# Patient Record
Sex: MALE | Race: WHITE
[De-identification: names, ages, dates, MRNs, and addresses within clinical notes are randomized per-mention and may not be internally consistent; named-entity substitution may affect disease eponyms.]

---

## 2017-09-18 ENCOUNTER — RX ONLY (RX ONLY)
Age: 37
End: 2017-09-18

## 2018-04-26 ENCOUNTER — APPOINTMENT (OUTPATIENT)
Dept: URBAN - METROPOLITAN AREA CLINIC 186 | Age: 38
Setting detail: DERMATOLOGY
End: 2018-04-26

## 2018-04-26 VITALS — HEIGHT: 72 IN | WEIGHT: 170 LBS

## 2018-04-26 DIAGNOSIS — D22 MELANOCYTIC NEVI: ICD-10-CM

## 2018-04-26 PROBLEM — D22.39 MELANOCYTIC NEVI OF OTHER PARTS OF FACE: Status: ACTIVE | Noted: 2018-04-26

## 2018-04-26 PROCEDURE — OTHER FOLLOW UP FOR NEXT VISIT: OTHER

## 2018-04-26 PROCEDURE — OTHER SHAVE REMOVAL (NO PATHOLOGY): OTHER

## 2018-04-26 PROCEDURE — 11310 SHAVE SKIN LESION 0.5 CM/<: CPT

## 2018-04-26 PROCEDURE — OTHER ADDITIONAL NOTES: OTHER

## 2018-04-26 PROCEDURE — OTHER REASSURANCE: OTHER

## 2018-04-26 PROCEDURE — OTHER COUNSELING: OTHER

## 2018-04-26 ASSESSMENT — LOCATION DETAILED DESCRIPTION DERM
LOCATION DETAILED: LEFT SUPERIOR LATERAL BUCCAL CHEEK
LOCATION DETAILED: LEFT INFERIOR CENTRAL MALAR CHEEK

## 2018-04-26 ASSESSMENT — LOCATION SIMPLE DESCRIPTION DERM: LOCATION SIMPLE: LEFT CHEEK

## 2018-04-26 ASSESSMENT — LOCATION ZONE DERM: LOCATION ZONE: FACE

## 2018-04-26 NOTE — PROCEDURE: SHAVE REMOVAL (NO PATHOLOGY)
Medical Necessity Information: It is in your best interest to select a reason for this procedure from the list below. All of these items fulfill various CMS LCD requirements except the new and changing color options.
Hemostasis: Drysol
Include Z78.9 (Other Specified Conditions Influencing Health Status) As An Associated Diagnosis?: No
Detail Level: Detailed
Anesthesia Type: 1% lidocaine with epinephrine
Wound Care: Petrolatum
Consent was obtained from the patient. The risks and benefits to therapy were discussed in detail. Specifically, the risks of infection, scarring, bleeding, prolonged wound healing, incomplete removal, allergy to anesthesia, nerve injury and recurrence were addressed. Prior to the procedure, the treatment site was clearly identified and confirmed by the patient. All components of Universal Protocol/PAUSE Rule completed.
Post-Care Instructions: I reviewed with the patient in detail post-care instructions. Patient is to keep the biopsy site dry overnight, and then apply bacitracin twice daily until healed. Patient may apply hydrogen peroxide soaks to remove any crusting.
X Size Of Lesion In Cm (Optional): 0
Medical Necessity Clause: This procedure was medically necessary because the lesion that was treated was: irritated when shaving
Path Notes (To The Dermatopathologist): Please check margins.

## 2018-08-27 ENCOUNTER — APPOINTMENT (OUTPATIENT)
Dept: URBAN - METROPOLITAN AREA CLINIC 186 | Age: 38
Setting detail: DERMATOLOGY
End: 2018-08-27

## 2018-08-27 DIAGNOSIS — L82.1 OTHER SEBORRHEIC KERATOSIS: ICD-10-CM

## 2018-08-27 DIAGNOSIS — D485 NEOPLASM OF UNCERTAIN BEHAVIOR OF SKIN: ICD-10-CM

## 2018-08-27 DIAGNOSIS — Z87.2 PERSONAL HISTORY OF DISEASES OF THE SKIN AND SUBCUTANEOUS TISSUE: ICD-10-CM

## 2018-08-27 DIAGNOSIS — L73.8 OTHER SPECIFIED FOLLICULAR DISORDERS: ICD-10-CM

## 2018-08-27 DIAGNOSIS — D18.0 HEMANGIOMA: ICD-10-CM

## 2018-08-27 DIAGNOSIS — L81.4 OTHER MELANIN HYPERPIGMENTATION: ICD-10-CM

## 2018-08-27 DIAGNOSIS — Z80.8 FAMILY HISTORY OF MALIGNANT NEOPLASM OF OTHER ORGANS OR SYSTEMS: ICD-10-CM

## 2018-08-27 DIAGNOSIS — D22 MELANOCYTIC NEVI: ICD-10-CM

## 2018-08-27 PROBLEM — D22.5 MELANOCYTIC NEVI OF TRUNK: Status: ACTIVE | Noted: 2018-08-27

## 2018-08-27 PROBLEM — D22.61 MELANOCYTIC NEVI OF RIGHT UPPER LIMB, INCLUDING SHOULDER: Status: ACTIVE | Noted: 2018-08-27

## 2018-08-27 PROBLEM — D18.01 HEMANGIOMA OF SKIN AND SUBCUTANEOUS TISSUE: Status: ACTIVE | Noted: 2018-08-27

## 2018-08-27 PROBLEM — D48.5 NEOPLASM OF UNCERTAIN BEHAVIOR OF SKIN: Status: ACTIVE | Noted: 2018-08-27

## 2018-08-27 PROBLEM — D22.39 MELANOCYTIC NEVI OF OTHER PARTS OF FACE: Status: ACTIVE | Noted: 2018-08-27

## 2018-08-27 PROCEDURE — 11100: CPT

## 2018-08-27 PROCEDURE — 99214 OFFICE O/P EST MOD 30 MIN: CPT | Mod: 25

## 2018-08-27 PROCEDURE — OTHER SUNSCREEN RECOMMENDATIONS: OTHER

## 2018-08-27 PROCEDURE — OTHER REASSURANCE: OTHER

## 2018-08-27 PROCEDURE — 11101: CPT

## 2018-08-27 PROCEDURE — OTHER COUNSELING: OTHER

## 2018-08-27 PROCEDURE — OTHER BIOPSY BY SHAVE METHOD: OTHER

## 2018-08-27 PROCEDURE — OTHER ADDITIONAL NOTES: OTHER

## 2018-08-27 ASSESSMENT — LOCATION SIMPLE DESCRIPTION DERM
LOCATION SIMPLE: RIGHT CHEEK
LOCATION SIMPLE: RIGHT FOREHEAD
LOCATION SIMPLE: RIGHT UPPER ARM
LOCATION SIMPLE: RIGHT PRETIBIAL REGION
LOCATION SIMPLE: LEFT FOREHEAD
LOCATION SIMPLE: LEFT POSTERIOR THIGH
LOCATION SIMPLE: LEFT UPPER BACK
LOCATION SIMPLE: CHEST
LOCATION SIMPLE: ABDOMEN
LOCATION SIMPLE: RIGHT POSTERIOR THIGH
LOCATION SIMPLE: LEFT CHEEK
LOCATION SIMPLE: RIGHT UPPER BACK

## 2018-08-27 ASSESSMENT — LOCATION DETAILED DESCRIPTION DERM
LOCATION DETAILED: LEFT PROXIMAL MEDIAL POSTERIOR THIGH
LOCATION DETAILED: RIGHT FOREHEAD
LOCATION DETAILED: RIGHT PROXIMAL LATERAL POSTERIOR THIGH
LOCATION DETAILED: LEFT CENTRAL MALAR CHEEK
LOCATION DETAILED: RIGHT SUPERIOR UPPER BACK
LOCATION DETAILED: UPPER STERNUM
LOCATION DETAILED: LEFT SUPERIOR UPPER BACK
LOCATION DETAILED: RIGHT INFERIOR CENTRAL MALAR CHEEK
LOCATION DETAILED: RIGHT CENTRAL MALAR CHEEK
LOCATION DETAILED: EPIGASTRIC SKIN
LOCATION DETAILED: LEFT MID-UPPER BACK
LOCATION DETAILED: LEFT FOREHEAD
LOCATION DETAILED: PERIUMBILICAL SKIN
LOCATION DETAILED: RIGHT PROXIMAL PRETIBIAL REGION
LOCATION DETAILED: RIGHT ANTERIOR DISTAL UPPER ARM

## 2018-08-27 ASSESSMENT — LOCATION ZONE DERM
LOCATION ZONE: FACE
LOCATION ZONE: TRUNK
LOCATION ZONE: ARM
LOCATION ZONE: LEG

## 2018-08-27 NOTE — PROCEDURE: REASSURANCE
Include Location In Plan?: Yes
Detail Level: Detailed
Detail Level: Zone
Hide Additional Notes?: No
Detail Level: Generalized

## 2018-08-27 NOTE — PROCEDURE: ADDITIONAL NOTES
Additional Notes: Patient states aunt and grandmother have history of Malignant Melanoma
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

## 2018-08-27 NOTE — PROCEDURE: BIOPSY BY SHAVE METHOD
Wound Care: Petrolatum
Anesthesia Type: 1% lidocaine with 1:100,000 epinephrine and a 1:10 solution of 8.4% sodium bicarbonate
Was A Bandage Applied: Yes
Additional Anesthesia Volume In Cc (Will Not Render If 0): 0
Depth Of Biopsy: dermis
Anesthesia Volume In Cc (Will Not Render If 0): 0.5
Billing Type: Third-Party Bill
Silver Nitrate Text: The wound bed was treated with silver nitrate after the biopsy was performed.
Electrodesiccation And Curettage Text: The wound bed was treated with electrodesiccation and curettage after the biopsy was performed.
Post-Care Instructions: I reviewed with the patient in detail post-care instructions. Patient is to keep the biopsy site dry overnight, and then apply Vaseline twice daily until healed.
Notification Instructions: Patient will be notified of biopsy results. However, patient instructed to call the office if not contacted within 2 weeks.
Bill 16189 For Specimen Handling/Conveyance To Laboratory?: no
Type Of Destruction Used: Curettage
Biopsy Method: Personna blade
Curettage Text: The wound bed was treated with curettage after the biopsy was performed.
Consent: Written consent was obtained and risks were reviewed including but not limited to scarring, infection, bleeding, scabbing, incomplete removal, nerve damage and allergy to anesthesia.
Electrodesiccation Text: The wound bed was treated with electrodesiccation after the biopsy was performed.
Biopsy Type: H and E
Hemostasis: Aluminum Chloride
Detail Level: Detailed
Cryotherapy Text: The wound bed was treated with cryotherapy after the biopsy was performed.
Dressing: bandage

## 2018-10-03 ENCOUNTER — APPOINTMENT (OUTPATIENT)
Dept: URBAN - METROPOLITAN AREA CLINIC 186 | Age: 38
Setting detail: DERMATOLOGY
End: 2018-10-03

## 2018-10-03 DIAGNOSIS — D22 MELANOCYTIC NEVI: ICD-10-CM

## 2018-10-03 PROBLEM — D48.5 NEOPLASM OF UNCERTAIN BEHAVIOR OF SKIN: Status: ACTIVE | Noted: 2018-10-03

## 2018-10-03 PROBLEM — D22.71 MELANOCYTIC NEVI OF RIGHT LOWER LIMB, INCLUDING HIP: Status: ACTIVE | Noted: 2018-10-03

## 2018-10-03 PROCEDURE — 11402 EXC TR-EXT B9+MARG 1.1-2 CM: CPT

## 2018-10-03 PROCEDURE — 12032 INTMD RPR S/A/T/EXT 2.6-7.5: CPT

## 2018-10-03 PROCEDURE — OTHER EXCISION: OTHER

## 2018-10-03 ASSESSMENT — LOCATION SIMPLE DESCRIPTION DERM: LOCATION SIMPLE: RIGHT PRETIBIAL REGION

## 2018-10-03 ASSESSMENT — LOCATION DETAILED DESCRIPTION DERM: LOCATION DETAILED: RIGHT PROXIMAL PRETIBIAL REGION

## 2018-10-03 ASSESSMENT — LOCATION ZONE DERM: LOCATION ZONE: LEG

## 2018-10-03 NOTE — PROCEDURE: EXCISION
Problem: Surgery Nonspecified (Adult)  Goal: Signs and Symptoms of Listed Potential Problems Will be Absent, Minimized or Managed (Surgery Nonspecified)  Signs and symptoms of listed potential problems will be absent, minimized or managed by discharge/transition of care (reference Surgery Nonspecified (Adult) CPG).   outpatient goals:  -Vital signs at baseline.   -Able to tolerate oral intake.   -Nurse to Notify Provider when Outpatient in a Bed discharge goals have been met and patient is ready for discharge.       Outcome: No Change  VSS on RA.  Pt refused capno around 0300, placed on pulse ox.  Back dressing CDI, MEME intact.  Pt given information regarding purpose of capno.  NS running at 75.  Rhodes draining well.  MEME intact.  Pain being managed with 5-10mg oxy.  Pt denies CP, SOB, N/V, numbness/tingling.  Pt not up from bed after surgery, activity encouraged but pt refused.  REPO encouraged.  Tolerating regular diet.  Pt hard of hearing.  Pt rolled side to side in bed.  Encouraging pt to dangle at side of bed, pt agreeable to try in morning  Pt calling appropriately.  Will continue to monitor.         Interpolation Flap Text: A decision was made to reconstruct the defect utilizing an interpolation axial flap and a staged reconstruction.  A telfa template was made of the defect.  This telfa template was then used to outline the interpolation flap.  The donor area for the pedicle flap was then injected with anesthesia.  The flap was excised through the skin and subcutaneous tissue down to the layer of the underlying musculature.  The interpolation flap was carefully excised within this deep plane to maintain its blood supply.  The edges of the donor site were undermined.   The donor site was closed in a primary fashion.  The pedicle was then rotated into position and sutured.  Once the tube was sutured into place, adequate blood supply was confirmed with blanching and refill.  The pedicle was then wrapped with xeroform gauze and dressed appropriately with a telfa and gauze bandage to ensure continued blood supply and protect the attached pedicle.

## 2018-10-03 NOTE — PROCEDURE: EXCISION
Path Notes (To The Dermatopathologist): Please check margins.\\nSuperior edge scored and inked\\nNo marks at 7 o’clock \\n// marks at 2 o’clock \\[id number] Path Notes (To The Dermatopathologist): Please check margins.\\nSuperior edge scored and inked\\nNo marks at 7 o’clock \\n// marks at 2 o’clock \\[id number]

## 2018-10-03 NOTE — PROCEDURE: EXCISION
Body Location Override (Optional - Billing Will Still Be Based On Selected Body Map Location If Applicable): right proximal pretibial region

## 2018-10-08 ENCOUNTER — APPOINTMENT (OUTPATIENT)
Dept: URBAN - METROPOLITAN AREA CLINIC 186 | Age: 38
Setting detail: DERMATOLOGY
End: 2018-10-08

## 2018-10-08 ENCOUNTER — RX ONLY (RX ONLY)
Age: 38
End: 2018-10-08

## 2018-10-08 DIAGNOSIS — Z48.817 ENCOUNTER FOR SURGICAL AFTERCARE FOLLOWING SURGERY ON THE SKIN AND SUBCUTANEOUS TISSUE: ICD-10-CM

## 2018-10-08 PROCEDURE — OTHER POST-OP WOUND CHECK: OTHER

## 2018-10-08 PROCEDURE — OTHER ORDER TESTS: OTHER

## 2018-10-08 PROCEDURE — OTHER DIAGNOSIS COMMENT: OTHER

## 2018-10-08 RX ORDER — DOXYCYCLINE HYCLATE 100 MG/1
CAPSULE, GELATIN COATED ORAL
Qty: 20 | Refills: 0 | Status: ERX | COMMUNITY
Start: 2018-10-08

## 2018-10-08 ASSESSMENT — PAIN INTENSITY VAS
HOW INTENSE IS YOUR PAIN 0 BEING NO PAIN, 10 BEING THE MOST SEVERE PAIN POSSIBLE?: NO PAIN

## 2018-10-08 ASSESSMENT — LOCATION DETAILED DESCRIPTION DERM
LOCATION DETAILED: RIGHT PROXIMAL PRETIBIAL REGION
LOCATION DETAILED: RIGHT PROXIMAL PRETIBIAL REGION
LOCATION DETAILED: RIGHT DISTAL PRETIBIAL REGION

## 2018-10-08 ASSESSMENT — LOCATION ZONE DERM
LOCATION ZONE: LEG

## 2018-10-08 ASSESSMENT — LOCATION SIMPLE DESCRIPTION DERM
LOCATION SIMPLE: RIGHT PRETIBIAL REGION

## 2018-10-08 NOTE — PROCEDURE: POST-OP WOUND CHECK
Add 51777 Cpt? (Important Note: In 2017 The Use Of 23459 Is Being Tracked By Cms To Determine Future Global Period Reimbursement For Global Periods): no
Detail Level: Detailed
Body Location Override (Optional - Billing Will Still Be Based On Selected Body Map Location If Applicable): right proximal pretibial region
Additional Comments: Possible folliculitis, continue wound care.

## 2018-10-08 NOTE — PROCEDURE: DIAGNOSIS COMMENT
Comment: Clinically warm and erythema. Does have pustules. Ddx would include infection versus folliculitis. Discussed with patient both and could be using Vaseline and bandage.  Culture today. Will start doxycycline 100 mg by mouth twice daily x 10 days and await culture \\Susu for inflammatory component
Detail Level: Zone

## 2018-10-08 NOTE — PROCEDURE: POST-OP WOUND CHECK
Body Location Override (Optional - Billing Will Still Be Based On Selected Body Map Location If Applicable): right proximal pretibial region
Add 88781 Cpt? (Important Note: In 2017 The Use Of 65544 Is Being Tracked By Cms To Determine Future Global Period Reimbursement For Global Periods): no
Additional Comments: Possible folliculitis, continue wound care
Detail Level: Detailed

## 2018-10-17 ENCOUNTER — APPOINTMENT (OUTPATIENT)
Dept: URBAN - METROPOLITAN AREA CLINIC 186 | Age: 38
Setting detail: DERMATOLOGY
End: 2018-10-17

## 2018-10-17 DIAGNOSIS — Z48.02 ENCOUNTER FOR REMOVAL OF SUTURES: ICD-10-CM

## 2018-10-17 DIAGNOSIS — D22 MELANOCYTIC NEVI: ICD-10-CM

## 2018-10-17 PROBLEM — D22.72 MELANOCYTIC NEVI OF LEFT LOWER LIMB, INCLUDING HIP: Status: ACTIVE | Noted: 2018-10-17

## 2018-10-17 PROCEDURE — OTHER SUTURE REMOVAL (GLOBAL PERIOD): OTHER

## 2018-10-17 PROCEDURE — 11402 EXC TR-EXT B9+MARG 1.1-2 CM: CPT

## 2018-10-17 PROCEDURE — OTHER EXCISION: OTHER

## 2018-10-17 PROCEDURE — 12032 INTMD RPR S/A/T/EXT 2.6-7.5: CPT

## 2018-10-17 ASSESSMENT — LOCATION DETAILED DESCRIPTION DERM
LOCATION DETAILED: RIGHT PROXIMAL PRETIBIAL REGION
LOCATION DETAILED: LEFT PROXIMAL POSTERIOR THIGH

## 2018-10-17 ASSESSMENT — LOCATION SIMPLE DESCRIPTION DERM
LOCATION SIMPLE: LEFT POSTERIOR THIGH
LOCATION SIMPLE: RIGHT PRETIBIAL REGION

## 2018-10-17 ASSESSMENT — LOCATION ZONE DERM: LOCATION ZONE: LEG

## 2018-10-17 NOTE — PROCEDURE: EXCISION
Body Location Override (Optional - Billing Will Still Be Based On Selected Body Map Location If Applicable): left proximal medial posterior thigh

## 2018-10-17 NOTE — PROCEDURE: EXCISION
Path Notes (To The Dermatopathologist): Please check margins.\\nSuperior edge scored and inked\\[id number] Path Notes (To The Dermatopathologist): Please check margins.\\nSuperior edge scored and inked\\[id number]

## 2018-10-17 NOTE — PROCEDURE: EXCISION
Left message for patient advising of dr. Alba's recommendations.        Bilateral Helical Rim Advancement Flap Text: The defect edges were debeveled with a #15 blade scalpel.  Given the location of the defect and the proximity to free margins (helical rim) a bilateral helical rim advancement flap was deemed most appropriate.  Using a sterile surgical marker, the appropriate advancement flaps were drawn incorporating the defect and placing the expected incisions between the helical rim and antihelix where possible.  The area thus outlined was incised through and through with a #15 scalpel blade.  With a skin hook and iris scissors, the flaps were gently and sharply undermined and freed up.

## 2018-10-17 NOTE — PROCEDURE: SUTURE REMOVAL (GLOBAL PERIOD)
Body Location Override (Optional - Billing Will Still Be Based On Selected Body Map Location If Applicable): right proximal pretibial region
Add 59977 Cpt? (Important Note: In 2017 The Use Of 48872 Is Being Tracked By Cms To Determine Future Global Period Reimbursement For Global Periods): no
Detail Level: Detailed

## 2018-10-31 ENCOUNTER — APPOINTMENT (OUTPATIENT)
Dept: URBAN - METROPOLITAN AREA CLINIC 186 | Age: 38
Setting detail: DERMATOLOGY
End: 2018-10-31

## 2018-10-31 DIAGNOSIS — Z48.02 ENCOUNTER FOR REMOVAL OF SUTURES: ICD-10-CM

## 2018-10-31 DIAGNOSIS — Z48.817 ENCOUNTER FOR SURGICAL AFTERCARE FOLLOWING SURGERY ON THE SKIN AND SUBCUTANEOUS TISSUE: ICD-10-CM

## 2018-10-31 PROCEDURE — OTHER POST-OP WOUND CHECK (NO GLOBAL PERIOD): OTHER

## 2018-10-31 PROCEDURE — 99212 OFFICE O/P EST SF 10 MIN: CPT

## 2018-10-31 PROCEDURE — OTHER SUTURE REMOVAL (NO GLOBAL PERIOD): OTHER

## 2018-10-31 ASSESSMENT — LOCATION SIMPLE DESCRIPTION DERM: LOCATION SIMPLE: LEFT POSTERIOR THIGH

## 2018-10-31 ASSESSMENT — LOCATION DETAILED DESCRIPTION DERM: LOCATION DETAILED: LEFT PROXIMAL POSTERIOR THIGH

## 2018-10-31 ASSESSMENT — LOCATION ZONE DERM: LOCATION ZONE: LEG

## 2018-10-31 NOTE — PROCEDURE: SUTURE REMOVAL (NO GLOBAL PERIOD)
Body Location Override (Optional - Billing Will Still Be Based On Selected Body Map Location If Applicable): left proximal medial posterior thigh
Detail Level: Detailed

## 2018-10-31 NOTE — PROCEDURE: POST-OP WOUND CHECK (NO GLOBAL PERIOD)
Detail Level: Detailed
Body Location Override (Optional - Billing Will Still Be Based On Selected Body Map Location If Applicable): left proximal medial posterior thigh

## 2018-11-14 ENCOUNTER — APPOINTMENT (OUTPATIENT)
Dept: URBAN - METROPOLITAN AREA CLINIC 186 | Age: 38
Setting detail: DERMATOLOGY
End: 2018-11-14

## 2018-11-14 DIAGNOSIS — D22 MELANOCYTIC NEVI: ICD-10-CM

## 2018-11-14 PROBLEM — D22.71 MELANOCYTIC NEVI OF RIGHT LOWER LIMB, INCLUDING HIP: Status: ACTIVE | Noted: 2018-11-14

## 2018-11-14 PROCEDURE — 12032 INTMD RPR S/A/T/EXT 2.6-7.5: CPT

## 2018-11-14 PROCEDURE — OTHER EXCISION: OTHER

## 2018-11-14 PROCEDURE — 11402 EXC TR-EXT B9+MARG 1.1-2 CM: CPT

## 2018-11-14 ASSESSMENT — LOCATION SIMPLE DESCRIPTION DERM: LOCATION SIMPLE: RIGHT POSTERIOR THIGH

## 2018-11-14 ASSESSMENT — LOCATION ZONE DERM: LOCATION ZONE: LEG

## 2018-11-14 ASSESSMENT — LOCATION DETAILED DESCRIPTION DERM: LOCATION DETAILED: RIGHT PROXIMAL LATERAL POSTERIOR THIGH

## 2018-11-14 NOTE — PROCEDURE: EXCISION
Body Location Override (Optional - Billing Will Still Be Based On Selected Body Map Location If Applicable): right proximal lateral posterior thigh

## 2018-11-14 NOTE — PROCEDURE: EXCISION
Path Notes (To The Dermatopathologist): Please check margins.\\nSuperior edge scored and inked\\[id number] Path Notes (To The Dermatopathologist): Please check margins.\\nSuperior edge scored and inked\\[id number]

## 2018-11-14 NOTE — PROCEDURE: EXCISION
Pt with mother in triage c/o bumping his head earlier today on a ledge and c/o dizziness for a few minutes after. Pt denies any dizziness or pain at this time. Pt denies any loc, nausea, or vomiting. Home Suture Removal Text: Patient was provided a home suture removal kit and will remove their sutures at home.  If they have any questions or difficulties they will call the office.

## 2018-11-16 ENCOUNTER — RX ONLY (RX ONLY)
Age: 38
End: 2018-11-16

## 2018-11-16 RX ORDER — DOXYCYCLINE HYCLATE 100 MG/1
CAPSULE, GELATIN COATED ORAL
Qty: 20 | Refills: 0 | Status: ERX | COMMUNITY
Start: 2018-11-16

## 2018-11-27 ENCOUNTER — APPOINTMENT (OUTPATIENT)
Dept: URBAN - METROPOLITAN AREA CLINIC 186 | Age: 38
Setting detail: DERMATOLOGY
End: 2019-01-08

## 2018-11-27 DIAGNOSIS — Z48.02 ENCOUNTER FOR REMOVAL OF SUTURES: ICD-10-CM

## 2018-11-27 PROCEDURE — OTHER SUTURE REMOVAL (GLOBAL PERIOD): OTHER

## 2018-11-27 ASSESSMENT — LOCATION ZONE DERM: LOCATION ZONE: LEG

## 2018-11-27 ASSESSMENT — PAIN INTENSITY VAS: HOW INTENSE IS YOUR PAIN 0 BEING NO PAIN, 10 BEING THE MOST SEVERE PAIN POSSIBLE?: NO PAIN

## 2018-11-27 ASSESSMENT — LOCATION SIMPLE DESCRIPTION DERM: LOCATION SIMPLE: RIGHT POSTERIOR THIGH

## 2018-11-27 ASSESSMENT — LOCATION DETAILED DESCRIPTION DERM: LOCATION DETAILED: RIGHT PROXIMAL LATERAL POSTERIOR THIGH

## 2018-11-27 NOTE — PROCEDURE: SUTURE REMOVAL (GLOBAL PERIOD)
Body Location Override (Optional - Billing Will Still Be Based On Selected Body Map Location If Applicable): Right Proximal Lateral Posterior Thigh
Detail Level: Detailed
Add 47090 Cpt? (Important Note: In 2017 The Use Of 95201 Is Being Tracked By Cms To Determine Future Global Period Reimbursement For Global Periods): no

## 2023-02-20 NOTE — PROCEDURE: EXCISION
Detail Level: Detailed Depth Of Biopsy: dermis Was A Bandage Applied: Yes Size Of Lesion In Cm: 0.6 X Size Of Lesion In Cm: 0 Biopsy Type: H and E Biopsy Method: Dermablade Anesthesia Type: 1% lidocaine with epinephrine Anesthesia Volume In Cc (Will Not Render If 0): 0.5 Hemostasis: Aluminum Chloride Wound Care: Vaseline Dressing: Band-Aid Destruction After The Procedure: No Type Of Destruction Used: Curettage Curettage Text: The wound bed was treated with curettage after the biopsy was performed. Cryotherapy Text: The wound bed was treated with cryotherapy after the biopsy was performed. Electrodesiccation Text: The wound bed was treated with electrodesiccation after the biopsy was performed. Electrodesiccation And Curettage Text: The wound bed was treated with electrodesiccation and curettage after the biopsy was performed. Silver Nitrate Text: The wound bed was treated with silver nitrate after the biopsy was performed. Lab: 6 Lab Facility: 3 Consent: Written consent was obtained and risks were reviewed including but not limited to scarring, infection, bleeding, scabbing, incomplete removal, nerve damage and allergy to anesthesia. Post-Care Instructions: I reviewed with the patient in detail post-care instructions. Patient is to keep the biopsy site dry overnight, wash with mild soap once per day, pat dry, and then apply Vaseline ointment twice daily until healed. Notification Instructions: Patient will be notified of biopsy results. However, patient instructed to call the office if not contacted within 2 weeks. Billing Type: Third-Party Bill Information: Selecting Yes will display possible errors in your note based on the variables you have selected. This validation is only offered as a suggestion for you. PLEASE NOTE THAT THE VALIDATION TEXT WILL BE REMOVED WHEN YOU FINALIZE YOUR NOTE. IF YOU WANT TO FAX A PRELIMINARY NOTE YOU WILL NEED TO TOGGLE THIS TO 'NO' IF YOU DO NOT WANT IT IN YOUR FAXED NOTE. Size Of Lesion In Cm: 1.5 Saucerization Excision Additional Text (Leave Blank If You Do Not Want): The margin was drawn around the clinically apparent lesion.  Incisions were then made along these lines, in a tangential fashion, to the appropriate tissue plane and the lesion was extirpated.

## 2024-09-24 NOTE — PROCEDURE: EXCISION
Addended by: RONNIE ANN on: 9/24/2024 02:53 PM     Modules accepted: Orders     Modified Advancement Flap Text: The defect edges were debeveled with a #15 scalpel blade.  Given the location of the defect, shape of the defect and the proximity to free margins a modified advancement flap was deemed most appropriate.  Using a sterile surgical marker, an appropriate advancement flap was drawn incorporating the defect and placing the expected incisions within the relaxed skin tension lines where possible.    The area thus outlined was incised deep to adipose tissue with a #15 scalpel blade.  The skin margins were undermined to an appropriate distance in all directions utilizing iris scissors.